# Patient Record
Sex: MALE | Race: WHITE | NOT HISPANIC OR LATINO | Employment: FULL TIME | URBAN - METROPOLITAN AREA
[De-identification: names, ages, dates, MRNs, and addresses within clinical notes are randomized per-mention and may not be internally consistent; named-entity substitution may affect disease eponyms.]

---

## 2018-01-22 ENCOUNTER — TELEPHONE (OUTPATIENT)
Dept: PHYSICAL MEDICINE AND REHAB | Facility: CLINIC | Age: 43
End: 2018-01-22

## 2018-01-22 NOTE — TELEPHONE ENCOUNTER
Spoke with patient offered appt but he needs to plan ahead so he would like to check his schedule then call us back to make an appt

## 2018-01-22 NOTE — TELEPHONE ENCOUNTER
----- Message from Shani RIDLEY Oj sent at 1/19/2018 12:47 PM CST -----  Contact: PT Portal Request  Appointment Request From: Elias Dennis    With Provider: Other - (see comments)    Would Accept With:Only the person I've selected    Preferred Date Range: From 1/16/2018 To 1/19/2018    Preferred Times: Any    Reason for visit: Request an Appt    Comments:  I'd like to schedule an appointment with Dr. Antonio Spivey, for a 6 month old sports related injury to my abdominal area that has been nagging and recently turned painful to the point that I cannot perform certain activities anymore.

## 2024-04-03 ENCOUNTER — OFFICE VISIT (OUTPATIENT)
Dept: FAMILY MEDICINE | Facility: CLINIC | Age: 49
End: 2024-04-03
Payer: COMMERCIAL

## 2024-04-03 VITALS
HEIGHT: 72 IN | BODY MASS INDEX: 29.74 KG/M2 | HEART RATE: 65 BPM | SYSTOLIC BLOOD PRESSURE: 124 MMHG | TEMPERATURE: 98 F | DIASTOLIC BLOOD PRESSURE: 88 MMHG | WEIGHT: 219.56 LBS | OXYGEN SATURATION: 97 %

## 2024-04-03 DIAGNOSIS — R53.83 OTHER FATIGUE: ICD-10-CM

## 2024-04-03 DIAGNOSIS — Z00.00 ROUTINE PHYSICAL EXAMINATION: Primary | ICD-10-CM

## 2024-04-03 DIAGNOSIS — R40.0 DAYTIME SOMNOLENCE: ICD-10-CM

## 2024-04-03 DIAGNOSIS — N52.01 ERECTILE DYSFUNCTION DUE TO ARTERIAL INSUFFICIENCY: ICD-10-CM

## 2024-04-03 DIAGNOSIS — Z12.11 SCREEN FOR COLON CANCER: ICD-10-CM

## 2024-04-03 DIAGNOSIS — K52.9 CHRONIC DIARRHEA: ICD-10-CM

## 2024-04-03 DIAGNOSIS — R68.82 DECREASED LIBIDO: ICD-10-CM

## 2024-04-03 PROCEDURE — 3008F BODY MASS INDEX DOCD: CPT | Mod: CPTII,S$GLB,, | Performed by: INTERNAL MEDICINE

## 2024-04-03 PROCEDURE — 99386 PREV VISIT NEW AGE 40-64: CPT | Mod: S$GLB,,, | Performed by: INTERNAL MEDICINE

## 2024-04-03 PROCEDURE — 3074F SYST BP LT 130 MM HG: CPT | Mod: CPTII,S$GLB,, | Performed by: INTERNAL MEDICINE

## 2024-04-03 PROCEDURE — 99999 PR PBB SHADOW E&M-NEW PATIENT-LVL IV: CPT | Mod: PBBFAC,,, | Performed by: INTERNAL MEDICINE

## 2024-04-03 PROCEDURE — 1160F RVW MEDS BY RX/DR IN RCRD: CPT | Mod: CPTII,S$GLB,, | Performed by: INTERNAL MEDICINE

## 2024-04-03 PROCEDURE — 3079F DIAST BP 80-89 MM HG: CPT | Mod: CPTII,S$GLB,, | Performed by: INTERNAL MEDICINE

## 2024-04-03 PROCEDURE — 1159F MED LIST DOCD IN RCRD: CPT | Mod: CPTII,S$GLB,, | Performed by: INTERNAL MEDICINE

## 2024-04-03 RX ORDER — TADALAFIL 20 MG/1
20 TABLET ORAL DAILY PRN
Qty: 15 TABLET | Refills: 11 | Status: SHIPPED | OUTPATIENT
Start: 2024-04-03 | End: 2025-04-03

## 2024-04-03 NOTE — PROGRESS NOTES
Subjective:       Patient ID: Elias Dennis is a 48 y.o. male.  Chief Complaint: Annual Exam     HPI    Here for routine health maintenance.      C/o fatigue, tiredness and not waking rested for a year.      C/o diarrhea 3-4 per day for 6 mo.  No blood, mucous, or black stool.      C/o decrease libido and ED.  Gets, doesn't stay.    New job 2 yr ago.  Lots of stress and travel.      Gained 15 lb over past year.  Does exercise    Assessment:       1. Routine physical examination    2. Other fatigue    3. Decreased libido    4. Daytime somnolence    5. Screen for colon cancer    6. Chronic diarrhea        Plan:       Routine physical examination  -     CBC Auto Differential; Future; Expected date: 04/03/2024  -     TSH; Future; Expected date: 04/03/2024  -     Comprehensive Metabolic Panel; Future; Expected date: 04/03/2024  -     Hemoglobin A1C; Future; Expected date: 04/03/2024  -     Lipid Panel; Future; Expected date: 04/03/2024  -     PSA, Screening; Future; Expected date: 04/03/2024    Other fatigue  -     Testosterone; Future; Expected date: 04/03/2024    Decreased libido  -     Testosterone; Future; Expected date: 04/03/2024    Daytime somnolence  -     Home Sleep Study; Future    Screen for colon cancer    Chronic diarrhea  -     Ambulatory referral/consult to Gastroenterology; Future; Expected date: 04/10/2024  -     Stool Exam-Ova,Cysts,Parasites; Future; Expected date: 04/03/2024  -     WBC, Stool; Future; Expected date: 04/03/2024  -     Stool culture; Future; Expected date: 04/03/2024  -     Giardia / Cryptosporidum, EIA; Future; Expected date: 04/03/2024  -     Clostridium difficile EIA; Future  -     Electrolyte and Osmolality Panel, Feces Random; Future; Expected date: 04/03/2024  -     Occult blood x 1, stool; Future; Expected date: 04/03/2024  -     C-Reactive Protein; Future; Expected date: 04/03/2024  -     Sedimentation rate; Future; Expected date: 04/03/2024              Wellness  "reviewed    If T low, repeat 1 mo, still = virtual discuss.      May benefit from losing 15 lb put on     Continue current management and monitor.  Other diagnoses were reviewed and found stable and will continue to monitor.  Counseled on regular exercise, maintenance of a healthy weight, balanced diet rich in fruits/vegetables and lean protein, and avoidance of unhealthy habits like smoking and excessive alcohol intake.   Also, counseled on importance of being compliant with medication, health appointments, diet and exercise.     On labs, tests.  If wnl, can do annual/ bi-annual       Medication List with Changes/Refills   Current Medications    AZELASTINE (ASTELIN) 137 MCG (0.1 %) NASAL SPRAY    1 spray (137 mcg total) by Nasal route 2 (two) times daily.    FLUTICASONE (FLONASE) 50 MCG/ACTUATION NASAL SPRAY    2 sprays by Each Nare route once daily.   Discontinued Medications    PANTOPRAZOLE (PROTONIX) 40 MG TABLET    1 daily       BP Readings from Last 3 Encounters:   04/03/24 124/88   06/15/16 120/88   05/17/16 122/78     No results found for: "HGBA1C"  Lab Results   Component Value Date    TSH 0.960 05/30/2016     Lab Results   Component Value Date    LDLCALC 108.4 05/30/2016    LDLCALC 123.4 02/02/2009     Lab Results   Component Value Date    TRIG 133 05/30/2016    TRIG 88 02/02/2009     Wt Readings from Last 3 Encounters:   04/03/24 99.6 kg (219 lb 9.3 oz)   06/15/16 105.5 kg (232 lb 9.4 oz)   05/17/16 106.8 kg (235 lb 7.2 oz)     Lab Results   Component Value Date    HGB 15.5 05/30/2016    HCT 43.7 05/30/2016    WBC 6.01 05/30/2016    ALT 19 05/30/2016    AST 19 05/30/2016     05/30/2016    K 4.1 05/30/2016    CREATININE 1.0 05/30/2016    PSA 0.40 05/30/2016           Review of Systems   Constitutional:  Positive for fatigue. Negative for diaphoresis and fever.   HENT:  Negative for drooling and nosebleeds.    Eyes:  Negative for discharge and redness.   Respiratory:  Negative for apnea and choking.  "   Cardiovascular:  Negative for chest pain and palpitations.   Gastrointestinal:  Positive for diarrhea. Negative for abdominal pain and nausea.   Skin:  Negative for color change.   Neurological:  Negative for seizures and syncope.   Psychiatric/Behavioral:  Negative for behavioral problems.            Objective:      Vitals:    04/03/24 1259   BP: 124/88   Pulse: 65   Temp: 97.8 °F (36.6 °C)     Physical Exam  Vitals reviewed.   Eyes:      Conjunctiva/sclera: Conjunctivae normal.   Neck:      Thyroid: No thyromegaly.      Trachea: Trachea normal.   Cardiovascular:      Rate and Rhythm: Normal rate and regular rhythm.      Comments: Edema negative  Pulmonary:      Effort: Pulmonary effort is normal.      Breath sounds: Normal breath sounds.   Abdominal:      General: Bowel sounds are normal.      Palpations: Abdomen is soft. There is no hepatomegaly.   Musculoskeletal:      Cervical back: Normal range of motion.      Comments: ROM normal bilateral  Strength normal bilateral   Skin:     General: Skin is warm and dry.   Neurological:      Deep Tendon Reflexes: Reflexes are normal and symmetric.   Psychiatric:      Comments: Alert and Oriented

## 2024-04-05 ENCOUNTER — LAB VISIT (OUTPATIENT)
Dept: LAB | Facility: HOSPITAL | Age: 49
End: 2024-04-05
Attending: INTERNAL MEDICINE
Payer: COMMERCIAL

## 2024-04-05 DIAGNOSIS — K52.9 CHRONIC DIARRHEA: ICD-10-CM

## 2024-04-05 LAB — OB PNL STL: NEGATIVE

## 2024-04-05 PROCEDURE — 87427 SHIGA-LIKE TOXIN AG IA: CPT | Performed by: INTERNAL MEDICINE

## 2024-04-05 PROCEDURE — 87329 GIARDIA AG IA: CPT | Performed by: INTERNAL MEDICINE

## 2024-04-05 PROCEDURE — 87209 SMEAR COMPLEX STAIN: CPT | Performed by: INTERNAL MEDICINE

## 2024-04-05 PROCEDURE — 87046 STOOL CULTR AEROBIC BACT EA: CPT | Performed by: INTERNAL MEDICINE

## 2024-04-05 PROCEDURE — 89055 LEUKOCYTE ASSESSMENT FECAL: CPT | Performed by: INTERNAL MEDICINE

## 2024-04-05 PROCEDURE — 84100 ASSAY OF PHOSPHORUS: CPT | Performed by: INTERNAL MEDICINE

## 2024-04-05 PROCEDURE — 87045 FECES CULTURE AEROBIC BACT: CPT | Performed by: INTERNAL MEDICINE

## 2024-04-05 PROCEDURE — 82272 OCCULT BLD FECES 1-3 TESTS: CPT | Performed by: INTERNAL MEDICINE

## 2024-04-05 PROCEDURE — 87449 NOS EACH ORGANISM AG IA: CPT | Performed by: INTERNAL MEDICINE

## 2024-04-05 PROCEDURE — 87449 NOS EACH ORGANISM AG IA: CPT | Mod: 91 | Performed by: INTERNAL MEDICINE

## 2024-04-06 LAB
C DIFF GDH STL QL: NEGATIVE
C DIFF TOX A+B STL QL IA: NEGATIVE
CRYPTOSP AG STL QL IA: NEGATIVE
G LAMBLIA AG STL QL IA: NEGATIVE
WBC #/AREA STL HPF: ABNORMAL /[HPF]

## 2024-04-07 LAB
E COLI SXT1 STL QL IA: NEGATIVE
E COLI SXT2 STL QL IA: NEGATIVE

## 2024-04-08 LAB — BACTERIA STL CULT: NORMAL

## 2024-04-09 ENCOUNTER — LAB VISIT (OUTPATIENT)
Dept: LAB | Facility: HOSPITAL | Age: 49
End: 2024-04-09
Attending: INTERNAL MEDICINE
Payer: COMMERCIAL

## 2024-04-09 DIAGNOSIS — K52.9 CHRONIC DIARRHEA: ICD-10-CM

## 2024-04-09 DIAGNOSIS — R68.82 DECREASED LIBIDO: ICD-10-CM

## 2024-04-09 DIAGNOSIS — Z00.00 ROUTINE PHYSICAL EXAMINATION: ICD-10-CM

## 2024-04-09 DIAGNOSIS — R53.83 OTHER FATIGUE: ICD-10-CM

## 2024-04-09 LAB
ALBUMIN SERPL BCP-MCNC: 4.2 G/DL (ref 3.5–5.2)
ALP SERPL-CCNC: 61 U/L (ref 55–135)
ALT SERPL W/O P-5'-P-CCNC: 17 U/L (ref 10–44)
ANION GAP SERPL CALC-SCNC: 6 MMOL/L (ref 8–16)
AST SERPL-CCNC: 18 U/L (ref 10–40)
BASOPHILS # BLD AUTO: 0.05 K/UL (ref 0–0.2)
BASOPHILS NFR BLD: 0.7 % (ref 0–1.9)
BILIRUB SERPL-MCNC: 0.7 MG/DL (ref 0.1–1)
BUN SERPL-MCNC: 16 MG/DL (ref 6–20)
CALCIUM SERPL-MCNC: 9.7 MG/DL (ref 8.7–10.5)
CHLORIDE SERPL-SCNC: 107 MMOL/L (ref 95–110)
CHOLEST SERPL-MCNC: 178 MG/DL (ref 120–199)
CHOLEST/HDLC SERPL: 3.4 {RATIO} (ref 2–5)
CO2 SERPL-SCNC: 27 MMOL/L (ref 23–29)
COMPLEXED PSA SERPL-MCNC: 0.78 NG/ML (ref 0–4)
CREAT SERPL-MCNC: 1.1 MG/DL (ref 0.5–1.4)
CRP SERPL-MCNC: 0.4 MG/L (ref 0–8.2)
DIFFERENTIAL METHOD BLD: NORMAL
EOSINOPHIL # BLD AUTO: 0.1 K/UL (ref 0–0.5)
EOSINOPHIL NFR BLD: 1.2 % (ref 0–8)
ERYTHROCYTE [DISTWIDTH] IN BLOOD BY AUTOMATED COUNT: 13.4 % (ref 11.5–14.5)
ERYTHROCYTE [SEDIMENTATION RATE] IN BLOOD BY PHOTOMETRIC METHOD: <2 MM/HR (ref 0–23)
EST. GFR  (NO RACE VARIABLE): >60 ML/MIN/1.73 M^2
ESTIMATED AVG GLUCOSE: 74 MG/DL (ref 68–131)
GLUCOSE SERPL-MCNC: 86 MG/DL (ref 70–110)
HBA1C MFR BLD: 4.2 % (ref 4–5.6)
HCT VFR BLD AUTO: 47.3 % (ref 40–54)
HDLC SERPL-MCNC: 52 MG/DL (ref 40–75)
HDLC SERPL: 29.2 % (ref 20–50)
HGB BLD-MCNC: 16.4 G/DL (ref 14–18)
IMM GRANULOCYTES # BLD AUTO: 0.02 K/UL (ref 0–0.04)
IMM GRANULOCYTES NFR BLD AUTO: 0.3 % (ref 0–0.5)
LDLC SERPL CALC-MCNC: 112.8 MG/DL (ref 63–159)
LYMPHOCYTES # BLD AUTO: 1.5 K/UL (ref 1–4.8)
LYMPHOCYTES NFR BLD: 23 % (ref 18–48)
MCH RBC QN AUTO: 30.5 PG (ref 27–31)
MCHC RBC AUTO-ENTMCNC: 34.7 G/DL (ref 32–36)
MCV RBC AUTO: 88 FL (ref 82–98)
MONOCYTES # BLD AUTO: 0.4 K/UL (ref 0.3–1)
MONOCYTES NFR BLD: 6.3 % (ref 4–15)
NEUTROPHILS # BLD AUTO: 4.6 K/UL (ref 1.8–7.7)
NEUTROPHILS NFR BLD: 68.5 % (ref 38–73)
NONHDLC SERPL-MCNC: 126 MG/DL
NRBC BLD-RTO: 0 /100 WBC
PLATELET # BLD AUTO: 165 K/UL (ref 150–450)
PMV BLD AUTO: 11 FL (ref 9.2–12.9)
POTASSIUM SERPL-SCNC: 4.5 MMOL/L (ref 3.5–5.1)
PROT SERPL-MCNC: 6.6 G/DL (ref 6–8.4)
RBC # BLD AUTO: 5.38 M/UL (ref 4.6–6.2)
SODIUM SERPL-SCNC: 140 MMOL/L (ref 136–145)
TESTOST SERPL-MCNC: 557 NG/DL (ref 304–1227)
TRIGL SERPL-MCNC: 66 MG/DL (ref 30–150)
TSH SERPL DL<=0.005 MIU/L-ACNC: 1.48 UIU/ML (ref 0.4–4)
WBC # BLD AUTO: 6.69 K/UL (ref 3.9–12.7)

## 2024-04-09 PROCEDURE — 84403 ASSAY OF TOTAL TESTOSTERONE: CPT | Performed by: INTERNAL MEDICINE

## 2024-04-09 PROCEDURE — 85025 COMPLETE CBC W/AUTO DIFF WBC: CPT | Performed by: INTERNAL MEDICINE

## 2024-04-09 PROCEDURE — 86140 C-REACTIVE PROTEIN: CPT | Performed by: INTERNAL MEDICINE

## 2024-04-09 PROCEDURE — 83036 HEMOGLOBIN GLYCOSYLATED A1C: CPT | Performed by: INTERNAL MEDICINE

## 2024-04-09 PROCEDURE — 84443 ASSAY THYROID STIM HORMONE: CPT | Performed by: INTERNAL MEDICINE

## 2024-04-09 PROCEDURE — 84153 ASSAY OF PSA TOTAL: CPT | Performed by: INTERNAL MEDICINE

## 2024-04-09 PROCEDURE — 85652 RBC SED RATE AUTOMATED: CPT | Performed by: INTERNAL MEDICINE

## 2024-04-09 PROCEDURE — 80061 LIPID PANEL: CPT | Performed by: INTERNAL MEDICINE

## 2024-04-09 PROCEDURE — 36415 COLL VENOUS BLD VENIPUNCTURE: CPT | Mod: PO | Performed by: INTERNAL MEDICINE

## 2024-04-09 PROCEDURE — 80053 COMPREHEN METABOLIC PANEL: CPT | Performed by: INTERNAL MEDICINE

## 2024-04-10 LAB — O+P STL MICRO: NORMAL

## 2024-04-11 LAB
CHLORIDE STL-SCNC: NORMAL MMOL/L
MAGNESIUM FLD-MCNC: NORMAL MG/DL
OSMOL GAP STL: NORMAL MOSM/KG
OSMOLALITY STL: NORMAL MOSM/KG
PHOSPHORUS, FECES: NORMAL
POTASSIUM FLD-SCNC: NORMAL MMOL/L
SODIUM STL-SCNC: NORMAL MMOL/L

## 2024-04-12 ENCOUNTER — TELEPHONE (OUTPATIENT)
Dept: FAMILY MEDICINE | Facility: CLINIC | Age: 49
End: 2024-04-12
Payer: COMMERCIAL

## 2024-04-12 NOTE — TELEPHONE ENCOUNTER
There was an issue with the Stool test for Electrolyte and Osmolality ordered on this patient from 4/5/2024.     Unfortunately, the reference lab received a sample that was insufficient in volume (Quantity Not Sufficient; QNS) for testing so the order has been cancelled and will need to be reordered and recollected if clinically indicated.

## 2024-04-12 NOTE — TELEPHONE ENCOUNTER
----- Message from Elena Burris sent at 4/12/2024  1:49 PM CDT -----  Regarding: lab test cancellation   There was an issue with the Stool test for Electrolyte and Osmolality ordered on this patient from 4/5/2024.     Unfortunately, the reference lab received a sample that was insufficient in volume (Quantity Not Sufficient; QNS) for testing so the order has been cancelled and will need to be reordered and recollected if clinically indicated.     If there are any questions, please call the Sendout lab at [phone]. [phone].  Anyone in the Send[facility] lab will be able to assist you.

## 2024-04-22 ENCOUNTER — OFFICE VISIT (OUTPATIENT)
Dept: FAMILY MEDICINE | Facility: CLINIC | Age: 49
End: 2024-04-22
Payer: COMMERCIAL

## 2024-04-22 DIAGNOSIS — K52.9 CHRONIC DIARRHEA: Primary | ICD-10-CM

## 2024-04-22 DIAGNOSIS — R53.82 CHRONIC FATIGUE, UNSPECIFIED: ICD-10-CM

## 2024-04-22 PROCEDURE — 99214 OFFICE O/P EST MOD 30 MIN: CPT | Mod: 95,,, | Performed by: INTERNAL MEDICINE

## 2024-04-22 PROCEDURE — 3044F HG A1C LEVEL LT 7.0%: CPT | Mod: CPTII,95,, | Performed by: INTERNAL MEDICINE

## 2024-04-22 RX ORDER — METRONIDAZOLE 500 MG/1
500 TABLET ORAL 3 TIMES DAILY
Qty: 30 TABLET | Refills: 0 | Status: SHIPPED | OUTPATIENT
Start: 2024-04-22 | End: 2024-05-02

## 2024-04-22 NOTE — PROGRESS NOTES
The patient location is: [place]  The chief complaint leading to consultation is: diarrhea  Visit type: Virtual visit with synchronous audio and video  Total time spent with patient:  10 m  Each patient to whom he or she provides medical services by telemedicine is:  (1) informed of the relationship between the physician and patient and the respective role of any other health care provider with respect to management of the patient; and (2) notified that he or she may decline to receive medical services by telemedicine and may withdraw from such care at any time.    Notes:     C/o fatigue, tiredness and not waking rested for a year.     Lots of stress and travel.   Labs wnl including TSH and testosterone.    Has apt for home sleep study.         C/o diarrhea now at a few intermittently.  Was 3-4 per day for 6 mo.  No blood, mucous, or black stool.  Stool studies negative except + neutrophils.  Has apt with GI            Review of Systems      Physical Exam      Assessment:       1. Chronic diarrhea    2. Chronic fatigue, unspecified        Plan:       Chronic diarrhea  -     metroNIDAZOLE (FLAGYL) 500 MG tablet; Take 1 tablet (500 mg total) by mouth 3 (three) times daily. for 10 days  Dispense: 30 tablet; Refill: 0    Chronic fatigue, unspecified            Medication List with Changes/Refills   New Medications    METRONIDAZOLE (FLAGYL) 500 MG TABLET    Take 1 tablet (500 mg total) by mouth 3 (three) times daily. for 10 days   Current Medications    AZELASTINE (ASTELIN) 137 MCG (0.1 %) NASAL SPRAY    1 spray (137 mcg total) by Nasal route 2 (two) times daily.    FLUTICASONE (FLONASE) 50 MCG/ACTUATION NASAL SPRAY    2 sprays by Each Nare route once daily.    TADALAFIL (CIALIS) 20 MG TAB    Take 1 tablet (20 mg total) by mouth daily as needed.     Knows no etoh with Abx   Continue current management and monitor.    Counseled on regular exercise, maintenance of a healthy weight, balanced diet rich in fruits/vegetables and  lean protein, and avoidance of unhealthy habits like smoking and excessive alcohol intake.   Also, counseled on importance of being compliant with medication, health appointments, diet and exercise.     Follow up in about 1 year (around 4/22/2025).

## 2024-11-11 ENCOUNTER — PATIENT MESSAGE (OUTPATIENT)
Dept: ADMINISTRATIVE | Facility: HOSPITAL | Age: 49
End: 2024-11-11
Payer: COMMERCIAL

## 2024-11-12 DIAGNOSIS — Z12.11 SCREENING FOR COLON CANCER: ICD-10-CM

## 2025-04-23 DIAGNOSIS — N52.01 ERECTILE DYSFUNCTION DUE TO ARTERIAL INSUFFICIENCY: ICD-10-CM

## 2025-04-23 RX ORDER — TADALAFIL 20 MG/1
20 TABLET ORAL DAILY PRN
Qty: 15 TABLET | Refills: 4 | Status: SHIPPED | OUTPATIENT
Start: 2025-04-23 | End: 2026-04-23

## 2025-04-23 NOTE — TELEPHONE ENCOUNTER
Refill Routing Note   Medication(s) are not appropriate for processing by [facility] for the following reason(s):        Required vitals outdated    [facility] action(s):  Defer   Requires appointment : Yes               Appointments  past 12m or future 3m with PCP    Date Provider   Last Visit   4/22/2024 Wesley Yoo MD   Next Visit   Visit date not found Wesley Yoo MD   ED visits in past 90 days: 0        Note composed:9:13 AM 04/23/2025

## 2025-04-23 NOTE — TELEPHONE ENCOUNTER
Care Due:                  Date            Visit Type   Department     Provider  --------------------------------------------------------------------------------                                ESTABLISHED                              PATIENT -    [facility]  Last Visit: 04-      [facility]       Wesley Yoo  Next Visit: None Scheduled  None         None Found                                                            Last  Test          Frequency    Reason                     Performed    Due Date  --------------------------------------------------------------------------------    Office Visit  15 months..  tadalafiL................  04- 07-    [facility] Embedded Care Due Messages. Reference number: [id number].   4/23/2025 7:42:57 AM CDT

## 2025-08-19 ENCOUNTER — PATIENT MESSAGE (OUTPATIENT)
Dept: ADMINISTRATIVE | Facility: HOSPITAL | Age: 50
End: 2025-08-19
Payer: COMMERCIAL